# Patient Record
Sex: MALE | Race: WHITE
[De-identification: names, ages, dates, MRNs, and addresses within clinical notes are randomized per-mention and may not be internally consistent; named-entity substitution may affect disease eponyms.]

---

## 2020-08-20 ENCOUNTER — HOSPITAL ENCOUNTER (OUTPATIENT)
Dept: HOSPITAL 75 - RAD | Age: 60
End: 2020-08-20
Attending: NURSE PRACTITIONER
Payer: MEDICAID

## 2020-08-20 DIAGNOSIS — J44.9: ICD-10-CM

## 2020-08-20 DIAGNOSIS — I73.9: Primary | ICD-10-CM

## 2020-08-20 DIAGNOSIS — I25.119: ICD-10-CM

## 2020-08-20 DIAGNOSIS — N20.0: ICD-10-CM

## 2020-08-20 LAB
ALBUMIN SERPL-MCNC: 4 GM/DL (ref 3.2–4.5)
ALP SERPL-CCNC: 78 U/L (ref 40–136)
ALT SERPL-CCNC: 13 U/L (ref 0–55)
BILIRUB SERPL-MCNC: 0.5 MG/DL (ref 0.1–1)
BUN/CREAT SERPL: 14
CALCIUM SERPL-MCNC: 8.7 MG/DL (ref 8.5–10.1)
CHLORIDE SERPL-SCNC: 109 MMOL/L (ref 98–107)
CO2 SERPL-SCNC: 25 MMOL/L (ref 21–32)
CREAT SERPL-MCNC: 1.03 MG/DL (ref 0.6–1.3)
GFR SERPLBLD BASED ON 1.73 SQ M-ARVRAT: > 60 ML/MIN
GLUCOSE SERPL-MCNC: 80 MG/DL (ref 70–105)
POTASSIUM SERPL-SCNC: 3.6 MMOL/L (ref 3.6–5)
PROT SERPL-MCNC: 6.9 GM/DL (ref 6.4–8.2)
SODIUM SERPL-SCNC: 143 MMOL/L (ref 135–145)

## 2020-08-20 PROCEDURE — 80053 COMPREHEN METABOLIC PANEL: CPT

## 2020-08-20 PROCEDURE — 93922 UPR/L XTREMITY ART 2 LEVELS: CPT

## 2020-08-20 PROCEDURE — 71250 CT THORAX DX C-: CPT

## 2020-08-20 PROCEDURE — 36415 COLL VENOUS BLD VENIPUNCTURE: CPT

## 2020-08-20 NOTE — DIAGNOSTIC IMAGING REPORT
Indication: Leg pain.



Ankle brachial indices bilaterally are within normal limits 1.2 

left and right.



Impression:  Normal bilateral ankle brachial indices.



Dictated by: 



  Dictated on workstation # [id number]

## 2020-08-20 NOTE — DIAGNOSTIC IMAGING REPORT
PROCEDURE: CT chest without contrast.



TECHNIQUE: Multiple contiguous axial images were obtained through

the chest without the use of intravenous contrast. Auto Exposure

Controls were utilized during the CT exam to meet ALARA standards

for radiation dose reduction. 



INDICATION:  Left chest wall pain with no known discrete injury.



Noncontrasted chest CT performed as the patient reported history

of IV contrast allergy upon arrival to the department.



There is no rib fracture deformity, no bony destructive process. 

No findings of dislocation of the costochondral junction. The

sternum and manubrium appeared intact.  No chest wall mass or

fluid collection. No axillary, hilar or mediastinal

lymphadenopathy. Some bibasilar zones of subsegmental

atelectasis.  No findings suggestive of pneumonia.  No suspicious

lung mass or dominant pulmonary nodule.  Probable incidental

right tracheal diverticulum noted,  No effusion or pneumothorax.

No findings of pneumonia or edema. The visualized upper abdomen

reveals a punctate 2 mm nonobstructing right upper pole renal

calculus.



IMPRESSION:  No acute chest wall abnormality. Clear lungs. 

Nonobstructive right upper pole renal calculus noted.



Dictated by: 



  Dictated on workstation # [id number]

## 2021-03-08 ENCOUNTER — HOSPITAL ENCOUNTER (EMERGENCY)
Dept: HOSPITAL 75 - ER | Age: 61
Discharge: HOME | End: 2021-03-08
Payer: MEDICAID

## 2021-03-08 VITALS — SYSTOLIC BLOOD PRESSURE: 143 MMHG | DIASTOLIC BLOOD PRESSURE: 92 MMHG

## 2021-03-08 VITALS — BODY MASS INDEX: 34.67 KG/M2 | HEIGHT: 66.97 IN | WEIGHT: 220.9 LBS

## 2021-03-08 DIAGNOSIS — Z91.041: ICD-10-CM

## 2021-03-08 DIAGNOSIS — H74.8X2: Primary | ICD-10-CM

## 2021-03-08 DIAGNOSIS — Z79.52: ICD-10-CM

## 2021-03-08 DIAGNOSIS — J32.1: ICD-10-CM

## 2021-03-08 DIAGNOSIS — Z88.1: ICD-10-CM

## 2021-03-08 LAB
ALBUMIN SERPL-MCNC: 4.2 GM/DL (ref 3.2–4.5)
ALP SERPL-CCNC: 76 U/L (ref 40–136)
ALT SERPL-CCNC: 18 U/L (ref 0–55)
BASOPHILS # BLD AUTO: 0.1 10^3/UL (ref 0–0.1)
BASOPHILS NFR BLD AUTO: 1 % (ref 0–10)
BILIRUB SERPL-MCNC: 0.6 MG/DL (ref 0.1–1)
BUN/CREAT SERPL: 13
CALCIUM SERPL-MCNC: 9.2 MG/DL (ref 8.5–10.1)
CHLORIDE SERPL-SCNC: 110 MMOL/L (ref 98–107)
CO2 SERPL-SCNC: 24 MMOL/L (ref 21–32)
CREAT SERPL-MCNC: 1.13 MG/DL (ref 0.6–1.3)
EOSINOPHIL # BLD AUTO: 1.1 10^3/UL (ref 0–0.3)
EOSINOPHIL NFR BLD AUTO: 11 % (ref 0–10)
GFR SERPLBLD BASED ON 1.73 SQ M-ARVRAT: > 60 ML/MIN
GLUCOSE SERPL-MCNC: 104 MG/DL (ref 70–105)
HCT VFR BLD CALC: 51 % (ref 40–54)
HGB BLD-MCNC: 17.8 G/DL (ref 13.3–17.7)
LYMPHOCYTES # BLD AUTO: 3.3 10^3/UL (ref 1–4)
LYMPHOCYTES NFR BLD AUTO: 33 % (ref 12–44)
MANUAL DIFFERENTIAL PERFORMED BLD QL: NO
MCH RBC QN AUTO: 32 PG (ref 25–34)
MCHC RBC AUTO-ENTMCNC: 35 G/DL (ref 32–36)
MCV RBC AUTO: 92 FL (ref 80–99)
MONOCYTES # BLD AUTO: 0.8 10^3/UL (ref 0–1)
MONOCYTES NFR BLD AUTO: 8 % (ref 0–12)
NEUTROPHILS # BLD AUTO: 4.7 10^3/UL (ref 1.8–7.8)
NEUTROPHILS NFR BLD AUTO: 47 % (ref 42–75)
PLATELET # BLD: 213 10^3/UL (ref 130–400)
PMV BLD AUTO: 9.8 FL (ref 9–12.2)
POTASSIUM SERPL-SCNC: 3.7 MMOL/L (ref 3.6–5)
PROT SERPL-MCNC: 7.3 GM/DL (ref 6.4–8.2)
SODIUM SERPL-SCNC: 146 MMOL/L (ref 135–145)
WBC # BLD AUTO: 10 10^3/UL (ref 4.3–11)

## 2021-03-08 PROCEDURE — 80053 COMPREHEN METABOLIC PANEL: CPT

## 2021-03-08 PROCEDURE — 85025 COMPLETE CBC W/AUTO DIFF WBC: CPT

## 2021-03-08 PROCEDURE — 93005 ELECTROCARDIOGRAM TRACING: CPT

## 2021-03-08 PROCEDURE — 72125 CT NECK SPINE W/O DYE: CPT

## 2021-03-08 PROCEDURE — 71046 X-RAY EXAM CHEST 2 VIEWS: CPT

## 2021-03-08 PROCEDURE — 36415 COLL VENOUS BLD VENIPUNCTURE: CPT

## 2021-03-08 PROCEDURE — 70450 CT HEAD/BRAIN W/O DYE: CPT

## 2021-03-08 NOTE — DIAGNOSTIC IMAGING REPORT
INDICATION: Burning sensation in the chest. 



EXAM: PA and lateral chest



FINDINGS: The heart size and pulmonary vascularity are normal.

The lungs are clear. There are no effusions or pneumothoraces.



IMPRESSION: Negative chest.



Dictated by: 



  Dictated on workstation # [id number]

## 2021-03-08 NOTE — ED COUGH/URI
General


Chief Complaint:  Respiratory Problems


Stated Complaint:  SOA


Source:  patient


Exam Limitations:  no limitations





History of Present Illness


Date Seen by Provider:  Mar 8, 2021


Time Seen by Provider:  19:56


Initial Comments


Chest burning onset this evening.  Chronic cough which is unchanged in nature 

related to his COPD.  No fevers or chills.  No body aches nausea vomiting or 

diarrhea.  He coughed earlier as he always does but he believes he messed 

something up in his neck when he did it.  He states he has some bulging disks 

and now has some worsening neck pain.  It does not radiate down either arm.


Timing/Duration:  just prior to arrival


Severity/Quality:  other (Chronic unchanged cough)


Prior Episodes/Possible Cause:  no prior episodes


Associated Symptoms:  denies symptoms





Allergies and Home Medications


Allergies


Coded Allergies:  


     erythromycin base (Verified  Allergy, Unknown, 3/8/21)


     iodine (Unverified  Allergy, Unknown, 8/20/20)





Home Medications


Ketorolac Tromethamine 10 Mg Tablet, 10 MG PO BID PRN for PAIN-SEVERE (8-10)


   Prescribed by: SUZIE HAYNES on 3/8/21 2103


Levofloxacin 750 Mg Tablet, 750 MG PO DAILY


   Prescribed by: SUZIE HAYNES on 3/8/21 2057


Prednisone 20 Mg Tab, 40 MG PO DAILY


   Prescribed by: SUZIE HAYNES on 3/8/21 2057





Patient Home Medication List


Home Medication List Reviewed:  Yes





Review of Systems


Review of Systems


Constitutional:  see HPI


EENTM:  see HPI


Respiratory:  see HPI, cough


Cardiovascular:  no symptoms reported


Genitourinary:  no symptoms reported


Musculoskeletal:  no symptoms reported


Skin:  no symptoms reported


Psychiatric/Neurological:  No Symptoms Reported


Hematologic/Lymphatic:  No Symptoms Reported


Immunological/Allergic:  no symptoms reported





Physical Exam





Vital Signs - First Documented








 3/8/21





 20:22


 


Temp 36.9


 


Pulse 99


 


Resp 22


 


B/P (MAP) 158/125 (136)


 


Pulse Ox 96


 


O2 Delivery Room Air





Capillary Refill :


Height: '"


Weight: lbs. oz. kg;  BMI


Method:


General Appearance:  WD/WN, no apparent distress, other (No distress normal 

respiratory rate lungs are clear oxygen saturation 96% on room air)


Eyes:  Bilateral Eye Normal Inspection, Bilateral Eye PERRL, Bilateral Eye EOMI


HEENT:  normal ENT inspection, other (There is no erythema or tenderness over 

the left mastoid process.  The left tympanic membrane is obscured by cerumen.  

Denies any pain to the left ear 1.5)


Respiratory:  normal breath sounds, no respiratory distress, no accessory muscle

use


Cardiovascular:  regular rate, rhythm, no murmur


Gastrointestinal:  normal bowel sounds, non tender, soft


Neurologic/Psychiatric:  alert, normal mood/affect, oriented x 3


Skin:  normal color, warm/dry





Progress/Results/Core Measures


Suspected Sepsis


SIRS


Temperature: 


Pulse:  


Respiratory Rate: 


 


Laboratory Tests


3/8/21 19:50: White Blood Count 10.0


Blood Pressure  / 


Mean: 


 





Laboratory Tests


3/8/21 19:50: 


Creatinine 1.13, Platelet Count 213, Total Bilirubin 0.6








Results/Orders


Lab Results





Laboratory Tests








Test


 3/8/21


19:50 Range/Units


 


 


White Blood Count


 10.0 


 4.3-11.0


10^3/uL


 


Red Blood Count


 5.55 H


 4.30-5.52


10^6/uL


 


Hemoglobin 17.8 H 13.3-17.7  g/dL


 


Hematocrit 51  40-54  %


 


Mean Corpuscular Volume 92  80-99  fL


 


Mean Corpuscular Hemoglobin 32  25-34  pg


 


Mean Corpuscular Hemoglobin


Concent 35 


 32-36  g/dL





 


Red Cell Distribution Width 12.6  10.0-14.5  %


 


Platelet Count


 213 


 130-400


10^3/uL


 


Mean Platelet Volume 9.8  9.0-12.2  fL


 


Immature Granulocyte % (Auto) 0   %


 


Neutrophils (%) (Auto) 47  42-75  %


 


Lymphocytes (%) (Auto) 33  12-44  %


 


Monocytes (%) (Auto) 8  0-12  %


 


Eosinophils (%) (Auto) 11 H 0-10  %


 


Basophils (%) (Auto) 1  0-10  %


 


Neutrophils # (Auto)


 4.7 


 1.8-7.8


10^3/uL


 


Lymphocytes # (Auto)


 3.3 


 1.0-4.0


10^3/uL


 


Monocytes # (Auto)


 0.8 


 0.0-1.0


10^3/uL


 


Eosinophils # (Auto)


 1.1 H


 0.0-0.3


10^3/uL


 


Basophils # (Auto)


 0.1 


 0.0-0.1


10^3/uL


 


Immature Granulocyte # (Auto)


 0.0 


 0.0-0.1


10^3/uL


 


Sodium Level 146 H 135-145  MMOL/L


 


Potassium Level 3.7  3.6-5.0  MMOL/L


 


Chloride Level 110 H   MMOL/L


 


Carbon Dioxide Level 24  21-32  MMOL/L


 


Anion Gap 12  5-14  MMOL/L


 


Blood Urea Nitrogen 15  7-18  MG/DL


 


Creatinine


 1.13 


 0.60-1.30


MG/DL


 


Estimat Glomerular Filtration


Rate > 60 


  





 


BUN/Creatinine Ratio 13   


 


Glucose Level 104    MG/DL


 


Calcium Level 9.2  8.5-10.1  MG/DL


 


Corrected Calcium 9.0  8.5-10.1  MG/DL


 


Total Bilirubin 0.6  0.1-1.0  MG/DL


 


Aspartate Amino Transf


(AST/SGOT) 14 


 5-34  U/L





 


Alanine Aminotransferase


(ALT/SGPT) 18 


 0-55  U/L





 


Alkaline Phosphatase 76    U/L


 


Total Protein 7.3  6.4-8.2  GM/DL


 


Albumin 4.2  3.2-4.5  GM/DL








My Orders





Orders - SUZIE HAYNES


Chest Pa/Lat (2 View) (3/8/21 19:50)


Cbc With Automated Diff (3/8/21 19:50)


Comprehensive Metabolic Panel (3/8/21 19:50)


Ekg Tracing (3/8/21 19:50)


Ed Iv/Invasive Line Start (3/8/21 19:50)


Ketorolac Injection (Toradol Injection) (3/8/21 20:00)


Ct Head/Cervical Spine Wo (3/8/21 20:25)


Levofloxacin  Tablet (Levaquin  Tablet) (3/8/21 21:00)


Prednisone Tablet (Deltasone Tablet) (3/8/21 21:00)





Medications Given in ED





Current Medications








 Medications  Dose


 Ordered  Sig/Obi


 Route  Start Time


 Stop Time Status Last Admin


Dose Admin


 


 Ketorolac


 Tromethamine  15 mg  ONCE  ONCE


 IVP  3/8/21 20:00


 3/8/21 20:01 DC 3/8/21 20:14


15 MG








Vital Signs/I&O











 3/8/21





 20:22


 


Temp 36.9


 


Pulse 99


 


Resp 22


 


B/P (MAP) 158/125 (136)


 


Pulse Ox 96


 


O2 Delivery Room Air





Capillary Refill :





Departure


Impression





   Primary Impression:  


   Left mastoid effusion


   Additional Impression:  


   Left frontal sinusitis


Disposition:  01 HOME, SELF-CARE


Condition:  Stable





Departure-Patient Inst.


Decision time for Depature:  20:55


Referrals:  


AYDEN BRUCE MD





[facility] (PCP/Family)


Primary Care Physician


Patient Instructions:  Sinusitis, Adult ED





Add. Discharge Instructions:  


1.  Return to ER for any concerns.  Take the steroids and antibiotics as 

directed.  Follow-up with Dr. Bruce from ear nose and throat.





All discharge instructions reviewed with patient and/or family. Voiced 

understanding.


Scripts


Ketorolac Tromethamine (Ketorolac Tromethamine) 10 Mg Tablet


10 MG PO BID PRN for PAIN-SEVERE (8-10), #8 TAB


   Prov: SUZIE HANYES         3/8/21 


Levofloxacin (Levofloxacin) 750 Mg Tablet


750 MG PO DAILY, #5 TAB


   Prov: SUZIE HAYNES         3/8/21 


Prednisone (Prednisone) 20 Mg Tab


40 MG PO DAILY, #6 TAB 0 Refills


   Prov: SUZIE HAYNES         3/8/21











SUZIE HAYNES              Mar 8, 2021 19:58

## 2021-03-08 NOTE — DIAGNOSTIC IMAGING REPORT
PROCEDURE: CT head and CT cervical spine without contrast.



TECHNIQUE: Multiple contiguous axial images were obtained through

the brain and cervical spine without the use of intravenous

contrast. Sagittal and coronal reformations through the cervical

spine were then performed. Auto Exposure Controls were utilized

during the CT exam to meet ALARA standards for radiation dose

reduction. 



INDICATION: Neck pain



CT HEAD: There is some fluid in the left frontal sinus. Ethmoid

air cells and maxillary sinuses are clear. Sphenoid sinuses are

clear. There is opacification of the left mastoid air cells. The

ventricles are normal in size, shape and position. There are no

masses or hemorrhages. There are no extra-axial fluid

collections.



IMPRESSION: 

1. Left mastoid effusion. 

2. Left frontal sinus membrane thickening that could be

sinusitis.



CT CERVICAL SPINE: The odontoid is intact. Atlantoaxial and

basicervical relationships appear normal. Vertebral body heights

and alignment appear normal. There is disc space narrowing at

C5-C6 and C6-C7. There is no fracture or misalignment.



IMPRESSION: 

1. No acute abnormality is seen in the cervical spine. 



 



Dictated by: 



  Dictated on workstation # [id number]

## 2021-04-07 ENCOUNTER — HOSPITAL ENCOUNTER (OUTPATIENT)
Dept: HOSPITAL 75 - PREOP | Age: 61
Discharge: HOME | End: 2021-04-07
Attending: OTOLARYNGOLOGY
Payer: MEDICAID

## 2021-04-07 VITALS — SYSTOLIC BLOOD PRESSURE: 143 MMHG | DIASTOLIC BLOOD PRESSURE: 101 MMHG

## 2021-04-07 VITALS — WEIGHT: 236.56 LBS | BODY MASS INDEX: 37.13 KG/M2 | HEIGHT: 67.01 IN

## 2021-04-07 DIAGNOSIS — Z01.812: Primary | ICD-10-CM

## 2021-04-07 DIAGNOSIS — Z20.822: ICD-10-CM

## 2021-04-07 DIAGNOSIS — R13.19: ICD-10-CM

## 2021-04-07 LAB
BASOPHILS # BLD AUTO: 0.1 10^3/UL (ref 0–0.1)
BASOPHILS NFR BLD AUTO: 1 % (ref 0–10)
BUN/CREAT SERPL: 17
CALCIUM SERPL-MCNC: 8.7 MG/DL (ref 8.5–10.1)
CHLORIDE SERPL-SCNC: 111 MMOL/L (ref 98–107)
CO2 SERPL-SCNC: 21 MMOL/L (ref 21–32)
CREAT SERPL-MCNC: 0.96 MG/DL (ref 0.6–1.3)
EOSINOPHIL # BLD AUTO: 1 10^3/UL (ref 0–0.3)
EOSINOPHIL NFR BLD AUTO: 11 % (ref 0–10)
GFR SERPLBLD BASED ON 1.73 SQ M-ARVRAT: > 60 ML/MIN
GLUCOSE SERPL-MCNC: 95 MG/DL (ref 70–105)
HCT VFR BLD CALC: 50 % (ref 40–54)
HGB BLD-MCNC: 16.8 G/DL (ref 13.3–17.7)
LYMPHOCYTES # BLD AUTO: 3.2 10^3/UL (ref 1–4)
LYMPHOCYTES NFR BLD AUTO: 37 % (ref 12–44)
MANUAL DIFFERENTIAL PERFORMED BLD QL: NO
MCH RBC QN AUTO: 32 PG (ref 25–34)
MCHC RBC AUTO-ENTMCNC: 34 G/DL (ref 32–36)
MCV RBC AUTO: 95 FL (ref 80–99)
MONOCYTES # BLD AUTO: 0.9 10^3/UL (ref 0–1)
MONOCYTES NFR BLD AUTO: 10 % (ref 0–12)
NEUTROPHILS # BLD AUTO: 3.4 10^3/UL (ref 1.8–7.8)
NEUTROPHILS NFR BLD AUTO: 40 % (ref 42–75)
PLATELET # BLD: 203 10^3/UL (ref 130–400)
PMV BLD AUTO: 9.9 FL (ref 9–12.2)
POTASSIUM SERPL-SCNC: 4.1 MMOL/L (ref 3.6–5)
SODIUM SERPL-SCNC: 143 MMOL/L (ref 135–145)
WBC # BLD AUTO: 8.6 10^3/UL (ref 4.3–11)

## 2021-04-07 PROCEDURE — 87635 SARS-COV-2 COVID-19 AMP PRB: CPT

## 2021-04-07 PROCEDURE — 87081 CULTURE SCREEN ONLY: CPT

## 2021-04-07 PROCEDURE — 36415 COLL VENOUS BLD VENIPUNCTURE: CPT

## 2021-04-07 PROCEDURE — 85025 COMPLETE CBC W/AUTO DIFF WBC: CPT

## 2021-04-07 PROCEDURE — 80048 BASIC METABOLIC PNL TOTAL CA: CPT

## 2021-04-09 ENCOUNTER — HOSPITAL ENCOUNTER (OUTPATIENT)
Dept: HOSPITAL 75 - SDC | Age: 61
Discharge: HOME | End: 2021-04-09
Attending: OTOLARYNGOLOGY
Payer: MEDICAID

## 2021-04-09 VITALS — DIASTOLIC BLOOD PRESSURE: 72 MMHG | SYSTOLIC BLOOD PRESSURE: 113 MMHG

## 2021-04-09 VITALS — DIASTOLIC BLOOD PRESSURE: 95 MMHG | SYSTOLIC BLOOD PRESSURE: 133 MMHG

## 2021-04-09 VITALS — SYSTOLIC BLOOD PRESSURE: 121 MMHG | DIASTOLIC BLOOD PRESSURE: 88 MMHG

## 2021-04-09 VITALS — SYSTOLIC BLOOD PRESSURE: 122 MMHG | DIASTOLIC BLOOD PRESSURE: 74 MMHG

## 2021-04-09 VITALS — BODY MASS INDEX: 37.13 KG/M2 | HEIGHT: 67.01 IN | WEIGHT: 236.56 LBS

## 2021-04-09 VITALS — DIASTOLIC BLOOD PRESSURE: 99 MMHG | SYSTOLIC BLOOD PRESSURE: 111 MMHG

## 2021-04-09 VITALS — DIASTOLIC BLOOD PRESSURE: 71 MMHG | SYSTOLIC BLOOD PRESSURE: 111 MMHG

## 2021-04-09 VITALS — SYSTOLIC BLOOD PRESSURE: 118 MMHG | DIASTOLIC BLOOD PRESSURE: 79 MMHG

## 2021-04-09 VITALS — DIASTOLIC BLOOD PRESSURE: 79 MMHG | SYSTOLIC BLOOD PRESSURE: 118 MMHG

## 2021-04-09 VITALS — DIASTOLIC BLOOD PRESSURE: 96 MMHG | SYSTOLIC BLOOD PRESSURE: 148 MMHG

## 2021-04-09 DIAGNOSIS — Z86.73: ICD-10-CM

## 2021-04-09 DIAGNOSIS — J34.89: ICD-10-CM

## 2021-04-09 DIAGNOSIS — H69.90: ICD-10-CM

## 2021-04-09 DIAGNOSIS — F17.200: ICD-10-CM

## 2021-04-09 DIAGNOSIS — R13.10: ICD-10-CM

## 2021-04-09 DIAGNOSIS — Z79.82: ICD-10-CM

## 2021-04-09 DIAGNOSIS — H65.22: Primary | ICD-10-CM

## 2021-04-09 DIAGNOSIS — Z88.1: ICD-10-CM

## 2021-04-09 DIAGNOSIS — Z82.3: ICD-10-CM

## 2021-04-09 DIAGNOSIS — I10: ICD-10-CM

## 2021-04-09 DIAGNOSIS — E66.9: ICD-10-CM

## 2021-04-09 DIAGNOSIS — K21.9: ICD-10-CM

## 2021-04-09 DIAGNOSIS — J44.9: ICD-10-CM

## 2021-04-09 DIAGNOSIS — J39.2: ICD-10-CM

## 2021-04-09 DIAGNOSIS — E78.5: ICD-10-CM

## 2021-04-09 DIAGNOSIS — Z88.8: ICD-10-CM

## 2021-04-09 DIAGNOSIS — Z79.899: ICD-10-CM

## 2021-04-09 DIAGNOSIS — M19.90: ICD-10-CM

## 2021-04-09 NOTE — ANESTHESIA-GENERAL POST-OP
General


Patient Condition


Mental Status/LOC:  Same as Preop


Cardiovascular:  Satisfactory


Nausea/Vomiting:  Absent


Respiratory:  Satisfactory


Pain:  Controlled


Complications:  Absent





Post Op Complications


Complications


None





Follow Up Care/Instructions


Patient Instructions


None needed.





Anesthesia/Patient Condition


Patient Condition


Patient is doing well, no complaints, stable vital signs, no apparent adverse 

anesthesia problems.











CHENCHO RAMIRES DO          Apr 9, 2021 11:57

## 2021-04-09 NOTE — PROGRESS NOTE-PRE OPERATIVE
Pre-Operative Progress Note


H&P Reviewed


The H&P was reviewed, patient examined and no changes noted.


Date Seen by Provider:  Apr 9, 2021


Time Seen by Provider:  09:00


Date H&P Reviewed:  Apr 9, 2021


Time H&P Reviewed:  09:00


Pre-Operative Diagnosis:  Left Ja, Possible nasopharyngeal mass, laryngeal mass











AYDEN SPENCER MD              Apr 9, 2021 10:00

## 2021-04-09 NOTE — PROGRESS NOTE-POST OPERATIVE
Post-Operative Progess Note


Surgeon (s)/Assistant (s)


Surgeon


AYDEN SPENCER MD


Assistant


n/a





Pre-Operative Diagnosis


Left Ja, Possible nasopharyngeal mass, laryngeal mass





Post-Operative Diagnosis


same





Post-Op Procedure Note


Date of Procedure:  Apr 9, 2021


Name of Procedure Performed:  


Left Myringotomy with Tube, Nasopharyngeal Biopsy, Direct Laryngoscopy


Description & Findings


Description and Findings:





n/a


Anesthesia Type


get


Estimated Blood Loss


minimal


Packing


none.


Specimen(s) collected/removed


nasopharyngeal biopsy











AYDEN SPENCER MD              Apr 9, 2021 10:00

## 2021-04-12 ENCOUNTER — HOSPITAL ENCOUNTER (OUTPATIENT)
Dept: HOSPITAL 75 - RAD | Age: 61
End: 2021-04-12
Attending: OTOLARYNGOLOGY
Payer: MEDICAID

## 2021-04-12 DIAGNOSIS — R13.10: Primary | ICD-10-CM

## 2021-04-12 PROCEDURE — 74220 X-RAY XM ESOPHAGUS 1CNTRST: CPT

## 2021-04-12 NOTE — DIAGNOSTIC IMAGING REPORT
INDICATION: Dysphagia.



The patient ingested effervescent crystals as well as thin and

thick barium and imaging of esophagus was performed in multiple

obliquities.



Preliminary radiograph of the chest is unremarkable. The

esophagus has a smooth contour. No mass or strictures identified.

No hiatal hernia is identified. There were episodes of

gastroesophageal reflux. There are also episodes of tertiary

contractions and esophageal dysmotility.



IMPRESSION: Generalized esophageal dysmotility and occasional

gastroesophageal reflux. No other significant abnormality was

detected.



Dictated by: 



  Dictated on workstation # [id number]

## 2021-05-04 ENCOUNTER — HOSPITAL ENCOUNTER (EMERGENCY)
Dept: HOSPITAL 75 - ER | Age: 61
LOS: 1 days | Discharge: LEFT BEFORE DISCHARGE | End: 2021-05-05
Payer: MEDICAID

## 2021-05-04 VITALS — WEIGHT: 250 LBS | BODY MASS INDEX: 37.89 KG/M2 | HEIGHT: 67.99 IN

## 2021-05-04 VITALS — DIASTOLIC BLOOD PRESSURE: 91 MMHG | SYSTOLIC BLOOD PRESSURE: 154 MMHG

## 2021-05-04 DIAGNOSIS — Z79.891: ICD-10-CM

## 2021-05-04 DIAGNOSIS — M54.9: ICD-10-CM

## 2021-05-04 DIAGNOSIS — R06.2: ICD-10-CM

## 2021-05-04 DIAGNOSIS — F17.210: ICD-10-CM

## 2021-05-04 DIAGNOSIS — Z86.73: ICD-10-CM

## 2021-05-04 DIAGNOSIS — R07.9: Primary | ICD-10-CM

## 2021-05-04 DIAGNOSIS — I10: ICD-10-CM

## 2021-05-04 DIAGNOSIS — Z91.041: ICD-10-CM

## 2021-05-04 DIAGNOSIS — J44.9: ICD-10-CM

## 2021-05-04 DIAGNOSIS — G89.29: ICD-10-CM

## 2021-05-04 DIAGNOSIS — Z88.1: ICD-10-CM

## 2021-05-04 PROCEDURE — 36415 COLL VENOUS BLD VENIPUNCTURE: CPT

## 2021-05-04 PROCEDURE — 85610 PROTHROMBIN TIME: CPT

## 2021-05-04 PROCEDURE — 83874 ASSAY OF MYOGLOBIN: CPT

## 2021-05-04 PROCEDURE — 93041 RHYTHM ECG TRACING: CPT

## 2021-05-04 PROCEDURE — 85730 THROMBOPLASTIN TIME PARTIAL: CPT

## 2021-05-04 PROCEDURE — 80053 COMPREHEN METABOLIC PANEL: CPT

## 2021-05-04 PROCEDURE — 84484 ASSAY OF TROPONIN QUANT: CPT

## 2021-05-04 PROCEDURE — 85025 COMPLETE CBC W/AUTO DIFF WBC: CPT

## 2021-05-04 PROCEDURE — 93005 ELECTROCARDIOGRAM TRACING: CPT

## 2021-05-04 PROCEDURE — 83735 ASSAY OF MAGNESIUM: CPT

## 2021-05-05 LAB
ALBUMIN SERPL-MCNC: 4.1 GM/DL (ref 3.2–4.5)
ALP SERPL-CCNC: 91 U/L (ref 40–136)
ALT SERPL-CCNC: 14 U/L (ref 0–55)
APTT BLD: 28 SEC (ref 24–35)
BASOPHILS # BLD AUTO: 0.1 10^3/UL (ref 0–0.1)
BASOPHILS NFR BLD AUTO: 1 % (ref 0–10)
BILIRUB SERPL-MCNC: 0.3 MG/DL (ref 0.1–1)
BUN/CREAT SERPL: 16
CALCIUM SERPL-MCNC: 9.2 MG/DL (ref 8.5–10.1)
CHLORIDE SERPL-SCNC: 108 MMOL/L (ref 98–107)
CO2 SERPL-SCNC: 24 MMOL/L (ref 21–32)
CREAT SERPL-MCNC: 1.05 MG/DL (ref 0.6–1.3)
EOSINOPHIL # BLD AUTO: 0.6 10^3/UL (ref 0–0.3)
EOSINOPHIL NFR BLD AUTO: 6 % (ref 0–10)
GFR SERPLBLD BASED ON 1.73 SQ M-ARVRAT: > 60 ML/MIN
GLUCOSE SERPL-MCNC: 129 MG/DL (ref 70–105)
HCT VFR BLD CALC: 48 % (ref 40–54)
HGB BLD-MCNC: 16.5 G/DL (ref 13.3–17.7)
INR PPP: 0.9 (ref 0.8–1.4)
LYMPHOCYTES # BLD AUTO: 3.2 10^3/UL (ref 1–4)
LYMPHOCYTES NFR BLD AUTO: 31 % (ref 12–44)
MAGNESIUM SERPL-MCNC: 2 MG/DL (ref 1.6–2.4)
MANUAL DIFFERENTIAL PERFORMED BLD QL: NO
MCH RBC QN AUTO: 32 PG (ref 25–34)
MCHC RBC AUTO-ENTMCNC: 34 G/DL (ref 32–36)
MCV RBC AUTO: 94 FL (ref 80–99)
MONOCYTES # BLD AUTO: 0.8 10^3/UL (ref 0–1)
MONOCYTES NFR BLD AUTO: 8 % (ref 0–12)
NEUTROPHILS # BLD AUTO: 5.7 10^3/UL (ref 1.8–7.8)
NEUTROPHILS NFR BLD AUTO: 55 % (ref 42–75)
PLATELET # BLD: 216 10^3/UL (ref 130–400)
PMV BLD AUTO: 9.8 FL (ref 9–12.2)
POTASSIUM SERPL-SCNC: 3.8 MMOL/L (ref 3.6–5)
PROT SERPL-MCNC: 7.3 GM/DL (ref 6.4–8.2)
PROTHROMBIN TIME: 12.6 SEC (ref 12.2–14.7)
SODIUM SERPL-SCNC: 145 MMOL/L (ref 135–145)
WBC # BLD AUTO: 10.4 10^3/UL (ref 4.3–11)

## 2021-05-05 NOTE — ED CHEST PAIN
General


Chief Complaint:  Chest Pain


Stated Complaint:  CP,LEFT ARM PAIN


Nursing Triage Note:  


PT AMB TO ROOM 6 W/ CO "KNOTS IN LEFT ARM THAT GO TO TOP OF LEFT SIDE OF CHEST."




PT HAS HAD LEFT ARM PAID X2 DAYS AND STATES THAT SX WORSENED TODAY AT 1900. PT 


DENIES SOA.


Nursing Sepsis Screen:  No Definite Risk


Source:  patient


Exam Limitations:  no limitations





History of Present Illness


Date Seen by Provider:  May 5, 2021


Time Seen by Provider:  00:07


Initial Comments


This 60-year-old man presents to the emergency room with complaints of left-

sided chest pain and a pain described as "knots" in his left arm.  The pain in 

his arm has been intermittent for the past 2 days.  The chest pain started 

around 19:00.  He denies any history of cardiac disease.  He is wheezing but 

denies shortness of breath.





Allergies and Home Medications


Allergies


Coded Allergies:  


     erythromycin base (Verified  Allergy, Unknown, 3/8/21)


     iodine (Unverified  Allergy, Unknown, 8/20/20)





Home Medications


Albuterol Sulfate 1 Puff Puff, 2 PUFF IH Q4H PRN for WHEEZING, (Reported)


   1 PUFF = 90 MCG 


Budesonide/Formoterol Fumarate 10.2 Gm Hfa.aer.ad, 2 PUFF IH BID, (Reported)


Hydrocodone/Acetaminophen 1 Each Tablet, 1-2 TAB PO Q4H PRN for PAIN-MODERATE 

(5-7)


   Prescribed by: HUONG DOMINGUEZ on 4/9/21 1059


[Garamycin Opth Soln]  , 3 DROPS LEFT EAR BID


   Prescribed by: HUONG DOMINGUEZ on 4/9/21 1059





Patient Home Medication List


Home Medication List Reviewed:  Yes





Review of Systems


Review of Systems


Constitutional:  no symptoms reported


EENTM:  No Symptoms Reported


Respiratory:  See HPI


Cardiovascular:  See HPI


Gastrointestinal:  No Symptoms Reported


Genitourinary:  No Symptoms Reported


Musculoskeletal:  no symptoms reported


Skin:  no symptoms reported


Psychiatric/Neurological:  No Symptoms Reported


Endocrine:  No Symptoms Reported


Hematologic/Lymphatic:  No Symptoms Reported





Past Medical-Social-Family Hx


Past Med/Social Hx:  Reviewed Nursing Past Med/Soc Hx


Patient Social History


Alcohol Use:  Denies Use


Smoking Status:  Current Everyday Smoker


Type Used:  Cigarettes


2nd Hand Smoke Exposure:  Yes


Recent Infectious Disease Expo:  No


Recent Hopitalizations:  No





Seasonal Allergies


Seasonal Allergies:  Yes





Past Medical History


Surgeries:  Yes (trach as child, "colon stapled" due to accident, )


Respiratory:  Yes


COPD


Currently Using CPAP:  No


Currently Using BIPAP:  No


Cardiac:  Yes (recently stopped all meds r/t upcoming sx)


High Cholesterol, Hypertension


Neurological:  Yes


Brain Tumor, Headaches /Migraines, TIA


Genitourinary:  No


Gastrointestinal:  Yes ("colon stapled" due to accident)


Musculoskeletal:  Yes


Back Injury, Chronic Back Pain


Endocrine:  No


HEENT:  Yes (LEFT CSOM)


Cancer:  No


Psychosocial:  No


Integumentary:  No


Blood Disorders:  No





Physical Exam


Vital Signs





Vital Signs - First Documented








 5/4/21





 23:58


 


Temp 36.5


 


Pulse 95


 


Resp 18


 


B/P (MAP) 154/91 (112)


 


Pulse Ox 96


 


O2 Delivery Room Air





Capillary Refill : Less Than 3 Seconds


Height, Weight, BMI


Height: '"


Weight: lbs. oz. kg; 38.00 BMI


Method:


General Appearance:  No Apparent Distress, WD/WN


HEENT:  PERRL/EOMI, Normal ENT Inspection


Neck:  Normal Inspection


Respiratory:  Normal Breath Sounds, No Accessory Muscle Use, No Respiratory 

Distress, Wheezing


Cardiovascular:  Regular Rate, Rhythm, No Edema, No Murmur


Gastrointestinal:  Normal Bowel Sounds, Non Tender, Soft


Extremity:  Normal Inspection, Non Tender, No Calf Tenderness, No Pedal Edema


Neurologic/Psychiatric:  Alert, Oriented x3, No Motor/Sensory Deficits, Normal 

Mood/Affect, CNs II-XII Norm as Tested


Skin:  Normal Color, Warm/Dry





Progress/Results/Core Measures


Results/Orders


Lab Results





Laboratory Tests








Test


 5/5/21


00:05 Range/Units


 


 


White Blood Count


 10.4 


 4.3-11.0


10^3/uL


 


Red Blood Count


 5.15 


 4.30-5.52


10^6/uL


 


Hemoglobin 16.5  13.3-17.7  g/dL


 


Hematocrit 48  40-54  %


 


Mean Corpuscular Volume 94  80-99  fL


 


Mean Corpuscular Hemoglobin 32  25-34  pg


 


Mean Corpuscular Hemoglobin


Concent 34 


 32-36  g/dL





 


Red Cell Distribution Width 13.0  10.0-14.5  %


 


Platelet Count


 216 


 130-400


10^3/uL


 


Mean Platelet Volume 9.8  9.0-12.2  fL


 


Immature Granulocyte % (Auto) 0   %


 


Neutrophils (%) (Auto) 55  42-75  %


 


Lymphocytes (%) (Auto) 31  12-44  %


 


Monocytes (%) (Auto) 8  0-12  %


 


Eosinophils (%) (Auto) 6  0-10  %


 


Basophils (%) (Auto) 1  0-10  %


 


Neutrophils # (Auto)


 5.7 


 1.8-7.8


10^3/uL


 


Lymphocytes # (Auto)


 3.2 


 1.0-4.0


10^3/uL


 


Monocytes # (Auto)


 0.8 


 0.0-1.0


10^3/uL


 


Eosinophils # (Auto)


 0.6 H


 0.0-0.3


10^3/uL


 


Basophils # (Auto)


 0.1 


 0.0-0.1


10^3/uL


 


Immature Granulocyte # (Auto)


 0.0 


 0.0-0.1


10^3/uL


 


Prothrombin Time 12.6  12.2-14.7  SEC


 


INR Comment 0.9  0.8-1.4  


 


Activated Partial


Thromboplast Time 28 


 24-35  SEC





 


Sodium Level 145  135-145  MMOL/L


 


Potassium Level 3.8  3.6-5.0  MMOL/L


 


Chloride Level 108 H   MMOL/L


 


Carbon Dioxide Level 24  21-32  MMOL/L


 


Anion Gap 13  5-14  MMOL/L


 


Blood Urea Nitrogen 17  7-18  MG/DL


 


Creatinine


 1.05 


 0.60-1.30


MG/DL


 


Estimat Glomerular Filtration


Rate > 60 


  





 


BUN/Creatinine Ratio 16   


 


Glucose Level 129 H   MG/DL


 


Calcium Level 9.2  8.5-10.1  MG/DL


 


Corrected Calcium 9.1  8.5-10.1  MG/DL


 


Magnesium Level 2.0  1.6-2.4  MG/DL


 


Total Bilirubin 0.3  0.1-1.0  MG/DL


 


Aspartate Amino Transf


(AST/SGOT) 13 


 5-34  U/L





 


Alanine Aminotransferase


(ALT/SGPT) 14 


 0-55  U/L





 


Alkaline Phosphatase 91    U/L


 


Myoglobin


 33.0 


 10.0-92.0


NG/ML


 


Troponin I < 0.028  <0.028  NG/ML


 


Total Protein 7.3  6.4-8.2  GM/DL


 


Albumin 4.1  3.2-4.5  GM/DL








My Orders





Orders - VAZQUEZ BUCKLEY MD


Cbc With Automated Diff (5/5/21 00:07)


Magnesium (5/5/21 00:07)


Ekg Tracing (5/5/21 00:07)


Comprehensive Metabolic Panel (5/5/21 00:07)


Myoglobin Serum (5/5/21 00:07)


Protime With Inr (5/5/21 00:07)


Partial Thromboplastin Time (5/5/21 00:07)


O2 (5/5/21 00:07)


Monitor-Rhythm Ecg Trace Only (5/5/21 00:07)


Ed Iv/Invasive Line Start (5/5/21 00:07)


Troponin I (5/5/21 00:07)


Nitroglycerin 0.4 Mg Btl 25's (Nitrostat (5/5/21 00:15)


Aspirin Chewable Tablet (Baby Aspirin Ch (5/5/21 00:15)





Medications Given in ED





Vital Signs/I&O











 5/4/21 5/4/21





 23:58 23:58


 


Temp  36.5


 


Pulse  95


 


Resp  18


 


B/P (MAP)  154/91 (112)


 


Pulse Ox  96


 


O2 Delivery Room Air Room Air














Blood Pressure Mean:                    112











Progress


Progress Note :  


   Time:  14:23


Progress Note


Patient was treated with aspirin and nitroglycerin x1.  This completely 

alleviated his pain.  Work-up was otherwise unremarkable.  I discussed the case 

with Dr. Uribe who recommended a 3 or 4-hour repeat troponin.  Patient declined

and decided to leave AGAINST MEDICAL ADVICE.  Encourage the patient to stay and 

explained the need to ensure he did not incur any significant heart injury.  He 

expressed understanding but decided to leave anyway.  Chest x-ray had also not 

yet been obtained prior to leaving [place].


Initial ECG Impression Date:  May 5, 2021


Initial ECG Impression Time:  00:01


Initial ECG Rate:  89


Initial ECG Rhythm:  Normal Sinus


Initial ECG Intervals:  Normal


Initial ECG Impression:  Normal


Comment


Normal sinus rhythm with no ST elevation or depression.  No abnormal intervals 

or axis deviation.





Departure


Impression





   Primary Impression:  


   Chest pain


   Qualified Codes:  R07.9 - Chest pain, unspecified


   Additional Impression:  


   Wheezing


Disposition:  07 AGAINST MEDICAL ADVICE


Condition:  Against Medical Advice





Departure-Patient Inst.


Referrals:  


RUSH RICE DO (PCP)


Primary Care Physician











VAZQUEZ BUCKLEY MD         May 5, 2021 07:49